# Patient Record
Sex: FEMALE | Race: BLACK OR AFRICAN AMERICAN | NOT HISPANIC OR LATINO | Employment: UNEMPLOYED | URBAN - METROPOLITAN AREA
[De-identification: names, ages, dates, MRNs, and addresses within clinical notes are randomized per-mention and may not be internally consistent; named-entity substitution may affect disease eponyms.]

---
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
## 2023-06-20 ENCOUNTER — PATIENT MESSAGE (OUTPATIENT)
Dept: RESEARCH | Facility: HOSPITAL | Age: 23
End: 2023-06-20

## 2024-11-19 ENCOUNTER — OUTPATIENT CASE MANAGEMENT (OUTPATIENT)
Dept: ADMINISTRATIVE | Facility: OTHER | Age: 24
End: 2024-11-19

## 2024-11-19 PROBLEM — O09.32 INITIAL OBSTETRIC VISIT IN SECOND TRIMESTER: Status: ACTIVE | Noted: 2024-11-19

## 2024-11-19 NOTE — PROGRESS NOTES
Outpatient Care Management   - Patient Assessment    Patient: Maryjane Vasquez  MRN:  [id number]  Date of Service:  11/19/2024  Completed by:  Patty Simmons LMSW  Referral Date:     Reason for Visit   Patient presents with    OPCM Enrollment Call    Social Work Assessment       Brief Summary:  received a referral from outpatient provider for the following Low/Mod SW psychosocial needs Initial O/B assessment..  The patient also requests assistance with location a therapist and information on advance directive. Care plan was created in collaboration with patient/caregiver input.   completed the SDOH questionnaire.     Pt lives with her grandfather and children. Pt does not work. Pt support system is her grandfather and FOB.Pt has Medicaid.